# Patient Record
Sex: FEMALE | Race: BLACK OR AFRICAN AMERICAN
[De-identification: names, ages, dates, MRNs, and addresses within clinical notes are randomized per-mention and may not be internally consistent; named-entity substitution may affect disease eponyms.]

---

## 2018-07-09 ENCOUNTER — HOSPITAL ENCOUNTER (EMERGENCY)
Dept: HOSPITAL 96 - M.ERS | Age: 23
Discharge: HOME | End: 2018-07-09
Payer: COMMERCIAL

## 2018-07-09 VITALS — SYSTOLIC BLOOD PRESSURE: 122 MMHG | DIASTOLIC BLOOD PRESSURE: 76 MMHG

## 2018-07-09 VITALS — WEIGHT: 134 LBS | HEIGHT: 66 IN | BODY MASS INDEX: 21.53 KG/M2

## 2018-07-09 DIAGNOSIS — G43.909: ICD-10-CM

## 2018-07-09 DIAGNOSIS — R55: Primary | ICD-10-CM

## 2018-07-09 LAB
ABSOLUTE BASOPHILS: 0.2 THOU/UL (ref 0–0.2)
ABSOLUTE EOSINOPHILS: 0.2 THOU/UL (ref 0–0.7)
ABSOLUTE MONOCYTES: 0.4 THOU/UL (ref 0–1.2)
ALBUMIN SERPL-MCNC: 4.1 G/DL (ref 3.4–5)
ALP SERPL-CCNC: 65 U/L (ref 46–116)
ALT SERPL-CCNC: 21 U/L (ref 30–65)
ANION GAP SERPL CALC-SCNC: 8 MMOL/L (ref 7–16)
ANISOCYTOSIS BLD QL SMEAR: (no result)
APAP SERPL-MCNC: < 2 UG/ML (ref 10–30)
AST SERPL-CCNC: 18 U/L (ref 15–37)
BACTERIA-REFLEX: (no result) /HPF
BASOPHILS NFR BLD AUTO: 4 %
BILIRUB SERPL-MCNC: 0.2 MG/DL
BILIRUB UR-MCNC: NEGATIVE MG/DL
BUN SERPL-MCNC: 10 MG/DL (ref 7–18)
CALCIUM SERPL-MCNC: 9.4 MG/DL (ref 8.5–10.1)
CHLORIDE SERPL-SCNC: 102 MMOL/L (ref 98–107)
CO2 SERPL-SCNC: 27 MMOL/L (ref 21–32)
COLOR UR: YELLOW
CREAT SERPL-MCNC: 0.9 MG/DL (ref 0.6–1.3)
EOSINOPHIL NFR BLD: 4 %
ETHANOL SERPL-MCNC: < 10 MG/DL (ref ?–10)
GIANT PLATELETS BLD QL SMEAR: (no result)
GLUCOSE SERPL-MCNC: 101 MG/DL (ref 70–99)
GRANULOCYTES NFR BLD MANUAL: 26 %
HCT VFR BLD CALC: 38.1 % (ref 37–47)
HGB BLD-MCNC: 12.3 GM/DL (ref 12–15)
KETONES UR STRIP-MCNC: NEGATIVE MG/DL
LG PLATELETS BLD QL SMEAR: (no result)
LIPASE: 360 U/L (ref 73–393)
LYMPHOCYTES # BLD: 3.4 THOU/UL (ref 0.8–5.3)
LYMPHOCYTES NFR BLD AUTO: 58 %
MCH RBC QN AUTO: 26.8 PG (ref 26–34)
MCHC RBC AUTO-ENTMCNC: 32.4 G/DL (ref 28–37)
MCV RBC: 82.9 FL (ref 80–100)
MONOCYTES NFR BLD: 6 %
MPV: 8.6 FL. (ref 7.2–11.1)
NEUTROPHILS # BLD: 1.7 THOU/UL (ref 1.6–8.1)
NEUTS BAND NFR BLD: 2 %
NT-PRO BRAIN NAT PEPTIDE: 43 PG/ML (ref ?–300)
NUCLEATED RBCS: 0 /100WBC
PLATELET # BLD EST: ADEQUATE 10*3/UL
PLATELET COUNT*: 320 THOU/UL (ref 150–400)
POTASSIUM SERPL-SCNC: 3.3 MMOL/L (ref 3.5–5.1)
PROT SERPL-MCNC: 8.8 G/DL (ref 6.4–8.2)
PROT UR QL STRIP: NEGATIVE
RBC # BLD AUTO: 4.59 MIL/UL (ref 4.2–5)
RBC # UR STRIP: (no result) /UL
RBC #/AREA URNS HPF: (no result) /HPF (ref 0–2)
RDW-CV: 14.8 % (ref 10.5–14.5)
SALICYLATES SERPL-MCNC: < 2.8 MG/DL (ref 2.8–20)
SODIUM SERPL-SCNC: 137 MMOL/L (ref 136–145)
SP GR UR STRIP: 1.01 (ref 1–1.03)
SQUAMOUS: (no result) /LPF (ref 0–3)
TROPONIN-I LEVEL: <0.06 NG/ML (ref ?–0.06)
URINE CLARITY: CLEAR
URINE GLUCOSE-RANDOM: NEGATIVE
URINE LEUKOCYTES-REFLEX: NEGATIVE
URINE NITRITE-REFLEX: NEGATIVE
URINE WBC-REFLEX: (no result) /HPF (ref 0–5)
UROBILINOGEN UR STRIP-ACNC: 0.2 E.U./DL (ref 0.2–1)
WBC # BLD AUTO: 5.9 THOU/UL (ref 4–11)

## 2018-07-10 NOTE — EKG
[place] [address]
Phone:  [phone]                     ELECTROCARDIOGRAM REPORT      
_______________________________________________________________________________
 
Name:       YOUNGAMELIE                   Room:                      [facility]#:  [id number]      Account #:      [id number]  
Admission:  18     Attend Phys:                         
Discharge:  18     Date of Birth:  [birth year]  
         Report #: [id number]
    [id number]
_______________________________________________________________________________
THIS REPORT FOR:  //name//                      
 
                         [facility] ED
                                       
Test Date:    2018               Test Time:    01:11:02
Pat Name:     AMELIE VIDAL             Department:   
Patient ID:   [id number]            Room:          
Gender:       F                        Technician:   GAURAV
:          1995               Requested By: Parvez Gutierrez
Order Number: [id number] MD:   Manjeet Gore
                                 Measurements
Intervals                              Axis          
Rate:         72                       P:            22
HI:           107                      QRS:          71
QRSD:         99                       T:            42
QT:           391                                    
QTc:          428                                    
                           Interpretive Statements
Sinus rhythm
Short HI interval
Baseline wander in lead(s) V1
No previous ECG available for comparison
 
Electronically Signed On 7- 13:32:37 CDT by Manjeet Gore
[email or web address]
 
 
 
 
 
 
 
 
 
 
 
 
 
 
 
 
 
 
  <ELECTRONICALLY SIGNED>
                                           By: Manjeet Gore MD, [facility]   
  07/10/18     1332
D: 07/111   _____________________________________
T: 18 011   Manjeet Gore MD, FACC     /EPI